# Patient Record
Sex: MALE | URBAN - METROPOLITAN AREA
[De-identification: names, ages, dates, MRNs, and addresses within clinical notes are randomized per-mention and may not be internally consistent; named-entity substitution may affect disease eponyms.]

---

## 2023-03-08 ENCOUNTER — APPOINTMENT (OUTPATIENT)
Dept: URBAN - METROPOLITAN AREA SURGERY 9 | Age: 53
Setting detail: DERMATOLOGY
End: 2023-03-08

## 2023-03-08 DIAGNOSIS — M67.4 GANGLION: ICD-10-CM

## 2023-03-08 PROBLEM — D48.5 NEOPLASM OF UNCERTAIN BEHAVIOR OF SKIN: Status: ACTIVE | Noted: 2023-03-08

## 2023-03-08 PROCEDURE — OTHER INTRALESIONAL KENALOG: OTHER

## 2023-03-08 PROCEDURE — OTHER MIPS QUALITY: OTHER

## 2023-03-08 PROCEDURE — 11900 INJECT SKIN LESIONS </W 7: CPT

## 2023-03-08 PROCEDURE — OTHER OTHER: OTHER

## 2023-03-08 ASSESSMENT — LOCATION SIMPLE DESCRIPTION DERM: LOCATION SIMPLE: LEFT HAND

## 2023-03-08 ASSESSMENT — LOCATION DETAILED DESCRIPTION DERM: LOCATION DETAILED: LEFT RADIAL PALM

## 2023-03-08 ASSESSMENT — LOCATION ZONE DERM: LOCATION ZONE: HAND

## 2023-03-08 NOTE — PROCEDURE: OTHER
Detail Level: Detailed
Render Risk Assessment In Note?: no
Note Text (......Xxx Chief Complaint.): This diagnosis correlates with the
Other (Free Text): Since lesion appears solid and not cystic, I would like to refer to hand surgeon for further evaluation and treatment.
Other (Free Text): Incision and Drainage, left radial palmProcedure Note:\\n\\nPre-op Size: 1.5 cm.\\nAn I&D was performed on left radial palm. Consent was obtained and risks were reviewed including but not limited to delayed wound healing, infection, need for multiple I and D's, and pain. The area was prepped in the usual clean fashion. Local anesthesia was achieved with 0.5 cc of 1% lidocaine without epinephrine. The lesion was incised with an 11 blade, and pressure was applied to the wound to drain the underlying contents. The lesion produced minimal bloody discharge. subQ still presenet. Vaseline and a pressure dressing were applied and wound care was reviewed.\\n